# Patient Record
Sex: FEMALE | Race: BLACK OR AFRICAN AMERICAN | Employment: FULL TIME | URBAN - METROPOLITAN AREA
[De-identification: names, ages, dates, MRNs, and addresses within clinical notes are randomized per-mention and may not be internally consistent; named-entity substitution may affect disease eponyms.]

---

## 2019-08-16 ENCOUNTER — HOSPITAL ENCOUNTER (EMERGENCY)
Age: 49
Discharge: HOME | End: 2019-08-16
Attending: EMERGENCY MEDICINE | Admitting: EMERGENCY MEDICINE
Payer: OTHER GOVERNMENT

## 2019-08-16 VITALS
RESPIRATION RATE: 16 BRPM | DIASTOLIC BLOOD PRESSURE: 73 MMHG | HEIGHT: 60 IN | TEMPERATURE: 98.7 F | OXYGEN SATURATION: 100 % | BODY MASS INDEX: 23.56 KG/M2 | HEART RATE: 66 BPM | WEIGHT: 120 LBS | SYSTOLIC BLOOD PRESSURE: 119 MMHG

## 2019-08-16 DIAGNOSIS — K85.90 ACUTE PANCREATITIS, UNSPECIFIED COMPLICATION STATUS, UNSPECIFIED PANCREATITIS TYPE: Primary | ICD-10-CM

## 2019-08-16 LAB
ALBUMIN SERPL-MCNC: 3.8 G/DL (ref 3.4–5)
ALBUMIN/GLOB SERPL: 1.2 {RATIO} (ref 0.8–1.7)
ALP SERPL-CCNC: 88 U/L (ref 45–117)
ALT SERPL-CCNC: 31 U/L (ref 13–56)
ANION GAP SERPL CALC-SCNC: 7 MMOL/L (ref 3–18)
APPEARANCE UR: ABNORMAL
AST SERPL-CCNC: 17 U/L (ref 10–38)
BACTERIA URNS QL MICRO: ABNORMAL /HPF
BASOPHILS # BLD: 0 K/UL (ref 0–0.1)
BASOPHILS NFR BLD: 0 % (ref 0–2)
BILIRUB SERPL-MCNC: 0.4 MG/DL (ref 0.2–1)
BILIRUB UR QL: NEGATIVE
BUN SERPL-MCNC: 11 MG/DL (ref 7–18)
BUN/CREAT SERPL: 14 (ref 12–20)
CALCIUM SERPL-MCNC: 8.5 MG/DL (ref 8.5–10.1)
CHLORIDE SERPL-SCNC: 103 MMOL/L (ref 100–111)
CO2 SERPL-SCNC: 31 MMOL/L (ref 21–32)
COLOR UR: YELLOW
CREAT SERPL-MCNC: 0.76 MG/DL (ref 0.6–1.3)
DIFFERENTIAL METHOD BLD: ABNORMAL
EOSINOPHIL # BLD: 0.4 K/UL (ref 0–0.4)
EOSINOPHIL NFR BLD: 6 % (ref 0–5)
EPITH CASTS URNS QL MICRO: ABNORMAL /LPF (ref 0–5)
ERYTHROCYTE [DISTWIDTH] IN BLOOD BY AUTOMATED COUNT: 12.7 % (ref 11.6–14.5)
GLOBULIN SER CALC-MCNC: 3.3 G/DL (ref 2–4)
GLUCOSE SERPL-MCNC: 135 MG/DL (ref 74–99)
GLUCOSE UR STRIP.AUTO-MCNC: NEGATIVE MG/DL
HCT VFR BLD AUTO: 36.4 % (ref 35–45)
HGB BLD-MCNC: 12.3 G/DL (ref 12–16)
HGB UR QL STRIP: ABNORMAL
KETONES UR QL STRIP.AUTO: NEGATIVE MG/DL
LEUKOCYTE ESTERASE UR QL STRIP.AUTO: NEGATIVE
LIPASE SERPL-CCNC: 600 U/L (ref 73–393)
LYMPHOCYTES # BLD: 1.4 K/UL (ref 0.9–3.6)
LYMPHOCYTES NFR BLD: 24 % (ref 21–52)
MCH RBC QN AUTO: 28.2 PG (ref 24–34)
MCHC RBC AUTO-ENTMCNC: 33.8 G/DL (ref 31–37)
MCV RBC AUTO: 83.5 FL (ref 74–97)
MONOCYTES # BLD: 0.2 K/UL (ref 0.05–1.2)
MONOCYTES NFR BLD: 4 % (ref 3–10)
NEUTS SEG # BLD: 4 K/UL (ref 1.8–8)
NEUTS SEG NFR BLD: 66 % (ref 40–73)
NITRITE UR QL STRIP.AUTO: NEGATIVE
PH UR STRIP: 6 [PH] (ref 5–8)
PLATELET # BLD AUTO: 225 K/UL (ref 135–420)
PMV BLD AUTO: 9.4 FL (ref 9.2–11.8)
POTASSIUM SERPL-SCNC: 4 MMOL/L (ref 3.5–5.5)
PROT SERPL-MCNC: 7.1 G/DL (ref 6.4–8.2)
PROT UR STRIP-MCNC: NEGATIVE MG/DL
RBC # BLD AUTO: 4.36 M/UL (ref 4.2–5.3)
RBC #/AREA URNS HPF: ABNORMAL /HPF (ref 0–5)
SODIUM SERPL-SCNC: 141 MMOL/L (ref 136–145)
SP GR UR REFRACTOMETRY: 1.01 (ref 1–1.03)
UROBILINOGEN UR QL STRIP.AUTO: 1 EU/DL (ref 0.2–1)
WBC # BLD AUTO: 6 K/UL (ref 4.6–13.2)
WBC URNS QL MICRO: NEGATIVE /HPF (ref 0–4)

## 2019-08-16 PROCEDURE — 85025 COMPLETE CBC W/AUTO DIFF WBC: CPT

## 2019-08-16 PROCEDURE — 96374 THER/PROPH/DIAG INJ IV PUSH: CPT

## 2019-08-16 PROCEDURE — 83690 ASSAY OF LIPASE: CPT

## 2019-08-16 PROCEDURE — 80053 COMPREHEN METABOLIC PANEL: CPT

## 2019-08-16 PROCEDURE — 81001 URINALYSIS AUTO W/SCOPE: CPT

## 2019-08-16 PROCEDURE — 99283 EMERGENCY DEPT VISIT LOW MDM: CPT

## 2019-08-16 PROCEDURE — 74011250636 HC RX REV CODE- 250/636: Performed by: PHYSICIAN ASSISTANT

## 2019-08-16 PROCEDURE — 96361 HYDRATE IV INFUSION ADD-ON: CPT

## 2019-08-16 RX ORDER — ONDANSETRON 4 MG/1
TABLET, ORALLY DISINTEGRATING ORAL
Qty: 10 TAB | Refills: 0 | Status: SHIPPED | OUTPATIENT
Start: 2019-08-16 | End: 2021-06-09

## 2019-08-16 RX ORDER — FAMOTIDINE 10 MG/ML
20 INJECTION INTRAVENOUS
Status: COMPLETED | OUTPATIENT
Start: 2019-08-16 | End: 2019-08-16

## 2019-08-16 RX ADMIN — SODIUM CHLORIDE 1000 ML: 900 INJECTION, SOLUTION INTRAVENOUS at 11:41

## 2019-08-16 RX ADMIN — FAMOTIDINE 20 MG: 10 INJECTION, SOLUTION INTRAVENOUS at 11:41

## 2019-08-16 NOTE — DISCHARGE INSTRUCTIONS
Patient Education        Pancreatitis: Care Instructions  Your Care Instructions    The pancreas is an organ behind the stomach. It makes hormones and enzymes to help your body digest food. But if these enzymes attack the pancreas, it can get inflamed. This is called pancreatitis. Most cases are caused by gallstones or by heavy alcohol use. If you take care of yourself at home, it will help you get better. It will also help you avoid more problems with your pancreas. Follow-up care is a key part of your treatment and safety. Be sure to make and go to all appointments, and call your doctor if you are having problems. It's also a good idea to know your test results and keep a list of the medicines you take. How can you care for yourself at home? · Drink clear liquids and eat bland foods until you feel better. [place] foods include rice, dry toast, and crackers. They also include bananas and applesauce. · Eat a low-fat diet until your doctor says your pancreas is healed. · Do not drink alcohol. Tell your doctor if you need help to quit. Counseling, support groups, and sometimes medicines can help you stay sober. · Be safe with medicines. Read and follow all instructions on the label. ? If the doctor gave you a prescription medicine for pain, take it as prescribed. ? If you are not taking a prescription pain medicine, ask your doctor if you can take an over-the-counter medicine. · If your doctor prescribed antibiotics, take them as directed. Do not stop taking them just because you feel better. You need to take the full course of antibiotics. · Get extra rest until you feel better. To prevent future problems with your pancreas  · Do not drink alcohol. · Tell your doctors and pharmacist that you've had pancreatitis. They can help you avoid medicines that may cause this problem again. When should you call for help? Call 911 anytime you think you may need emergency care.  For example, call if:    · You vomit blood or what looks like coffee grounds.     · Your stools are maroon or very bloody.    Call your doctor now or seek immediate medical care if:    · You have new or worse belly pain.     · Your stools are black and look like tar, or they have streaks of blood.     · You are vomiting.    Watch closely for changes in your health, and be sure to contact your doctor if:    · You do not get better as expected. Where can you learn more? Go to http://nithin-lenore.info/. Enter T216 in the search box to learn more about \"Pancreatitis: Care Instructions. \"  Current as of: November 7, 2018  Content Version: 12.1  © 0217-0331 [employer]wise, [employer]. Care instructions adapted under license by [employer] (which disclaims liability or warranty for this information). If you have questions about a medical condition or this instruction, always ask your healthcare professional. [address] any warranty or liability for your use of this information.

## 2019-08-16 NOTE — ED PROVIDER NOTES
[facility] EMERGENCY DEPT      10:57 AM    Date: 8/16/2019  Patient Name: Franchesca Aldana    History of Presenting Illness     Chief Complaint   Patient presents with    Abdominal Pain       51 y.o. female with noted past medical history including GERD and IBS who presents to the emergency department c/o abdominal pain for the past 5 days. She states it is a mild aching pain that is constant in nature. She has not been eating as much as usual and is slightly nauseas. Pt is having normal bowel movements, most recent was this morning. She denies any fever, chills, vomiting, diarrhea, constipation, blood per rectum or other symptoms at this time. Patient denies any other associated signs or symptoms. Patient denies any other complaints. Nursing notes regarding the HPI and triage nursing notes were reviewed. Prior medical records were reviewed. Current Outpatient Medications   Medication Sig Dispense Refill    ondansetron (ZOFRAN ODT) 4 mg disintegrating tablet Take 1-2 tablets every 6-8 hours as needed for nausea and vomiting. 10 Tab 0    omeprazole (PRILOSEC) 20 mg capsule Take 20 mg by mouth daily.  levothyroxine (SYNTHROID) 75 mcg tablet 75 mcg nightly.  loratadine (CLARITIN) 10 mg tablet Take 10 mg by mouth daily as needed.  albuterol (PROVENTIL HFA, VENTOLIN HFA, PROAIR HFA) 90 mcg/actuation inhaler Take 2 Puffs by inhalation every six (6) hours as needed for Wheezing.          Past History     Past Medical History:  Past Medical History:   Diagnosis Date    Adverse effect of anesthesia     says \" stopped breathing with EGD\"    Arthritis     Asthma     Bacteria in urine     Cervical endometriosis     Chronic cystitis     Complex renal cyst     Left Bosniak II complex renal cyst stable at 2.7cm      Endometriosis     Essential hypertension     GERD (gastroesophageal reflux disease)     Graves disease     Gross hematuria     IBS (irritable bowel syndrome)  Kidney stone     Menorrhagia     Prediabetes 2018    Recurrent UTI     Renal stone     Sickle cell trait (HCC)     Tachycardia     Ureteral stone        Past Surgical History:  Past Surgical History:   Procedure Laterality Date    HX CARPAL TUNNEL RELEASE      HX COLONOSCOPY  01/2014    [address]    HX CYSTECTOMY      HX HERNIA REPAIR      [facility]    HX OTHER SURGICAL  08/2014    iron tranfusions    HX OTHER SURGICAL  10/2017    patient thinks she had a endoscopy    HX PELVIC LAPAROSCOPY      HX TUBAL LIGATION      HX UROLOGICAL  08/15/2016    Cysto, bilateral RPG, bladder biopsies and fulguration. Dr. Manuel Villar, [address].  HX UROLOGICAL  02/20/2019    Left ESWL. Dr. Amol Beard. Family History:  Family History   Problem Relation Age of Onset    Cancer Maternal Grandmother     Heart Disease Mother     Heart Disease Father     Diabetes Maternal Aunt        Social History:  Social History     Tobacco Use    Smoking status: Never Smoker    Smokeless tobacco: Never Used   Substance Use Topics    Alcohol use: No     Frequency: Never    Drug use: No       Allergies: Allergies   Allergen Reactions    Acetaminophen Other (comments)     Denies this allergy    Benadryl [Diphenhydramine Hcl] Rash    Codeine Other (comments)     Gi stress  Stomach cramps    Hydrocodone Other (comments)     Causes stomach cramps. Patient states she can take  Tylenol    Hydrocodone-Acetaminophen Other (comments)     Gi stress    Tylenol-Codeine #3 [Acetaminophen-Codeine] Other (comments)     Gi stress. Can take Tylenol       Patient's primary care provider (as noted in EPIC): To, Phil Schaumann, MD    Review of Systems   Constitutional:  Denies malaise, fever, chills. GI/ABD: + periumbilical pain. Denies injury, distention, nausea, vomiting, diarrhea. :  Denies injury, pain, dysuria or urgency. Back:  Denies injury or pain. Pelvis:  Denies injury or pain.    Extremity/MS: Denies injury or pain. Skin: Denies injury, rash, itching or skin changes. All other systems negative as reviewed. Visit Vitals  /73 (BP 1 Location: Right arm, BP Patient Position: At rest)   Pulse 66   Temp 98.7 °F (37.1 °C)   Resp 16   Ht 5' (1.524 m)   Wt 54.4 kg (120 lb)   SpO2 100%   BMI 23.44 kg/m²       Patient Vitals for the past 12 hrs:   Temp Pulse Resp BP SpO2   08/16/19 1057 98.7 °F (37.1 °C) 66 16 119/73 100 %       PHYSICAL EXAM:    CONSTITUTIONAL:  Alert, in no apparent distress;  well developed;  well nourished. HEAD:  Normocephalic, atraumatic. EYES:  EOMI. Non-icteric sclera. Normal conjunctiva. ENTM:  Mouth: mucous membranes moist.  NECK:  Supple  RESPIRATORY:  Chest clear, equal breath sounds, good air movement. Without wheezes, rhonchi or rales. CARDIOVASCULAR:  Regular rate and rhythm. No murmurs, rubs, or gallops. GI:  Normal bowel sounds, abdomen soft and non-tender. No rebound or guarding. BACK:  Non-tender. UPPER EXT:  Normal inspection. NEURO:  Moves all four extremities, and grossly normal motor exam.  SKIN:  No rashes;  Normal for age. PSYCH:  Alert and normal affect. DIFFERENTIAL DIAGNOSES/ MEDICAL DECISION MAKING:  Gastritis, gerd, peptic ulcer disease, cholecystitis, pancreatitis, gastroenteritis, hepatitis, constipation related pain, appendicitis pain, diverticulitis, urinary tract infection, obstruction, abdominal wall pain, versus combination of the above and/or numerous other processes/ etiologies. ED COURSE AND MEDICAL DECISION MAKING:    The patient's pain improved in the ED with the noted medications. On reassessment of the patient, the patient continues to have no surgical abdomen with no rebound nor guarding. The patient does not appear septic by presentation, vital signs and laboratory results. The patient continues to appear non-toxic in the emergency department on reevaluations.     Recent Results (from the past 12 hour(s)) URINALYSIS W/ RFLX MICROSCOPIC    Collection Time: 08/16/19 11:02 AM   Result Value Ref Range    Color YELLOW      Appearance CLOUDY      Specific gravity 1.014 1.005 - 1.030      pH (UA) 6.0 5.0 - 8.0      Protein NEGATIVE  NEG mg/dL    Glucose NEGATIVE  NEG mg/dL    Ketone NEGATIVE  NEG mg/dL    Bilirubin NEGATIVE  NEG      Blood SMALL (A) NEG      Urobilinogen 1.0 0.2 - 1.0 EU/dL    Nitrites NEGATIVE  NEG      Leukocyte Esterase NEGATIVE  NEG     URINE MICROSCOPIC ONLY    Collection Time: 08/16/19 11:02 AM   Result Value Ref Range    WBC NEGATIVE  0 - 4 /hpf    RBC 0 to 3 0 - 5 /hpf    Epithelial cells 2+ 0 - 5 /lpf    Bacteria FEW (A) NEG /hpf   CBC WITH AUTOMATED DIFF    Collection Time: 08/16/19 11:10 AM   Result Value Ref Range    WBC 6.0 4.6 - 13.2 K/uL    RBC 4.36 4.20 - 5.30 M/uL    HGB 12.3 12.0 - 16.0 g/dL    HCT 36.4 35.0 - 45.0 %    MCV 83.5 74.0 - 97.0 FL    MCH 28.2 24.0 - 34.0 PG    MCHC 33.8 31.0 - 37.0 g/dL    RDW 12.7 11.6 - 14.5 %    PLATELET 854 249 - 833 K/uL    MPV 9.4 9.2 - 11.8 FL    NEUTROPHILS 66 40 - 73 %    LYMPHOCYTES 24 21 - 52 %    MONOCYTES 4 3 - 10 %    EOSINOPHILS 6 (H) 0 - 5 %    BASOPHILS 0 0 - 2 %    ABS. NEUTROPHILS 4.0 1.8 - 8.0 K/UL    ABS. LYMPHOCYTES 1.4 0.9 - 3.6 K/UL    ABS. MONOCYTES 0.2 0.05 - 1.2 K/UL    ABS. EOSINOPHILS 0.4 0.0 - 0.4 K/UL    ABS.  BASOPHILS 0.0 0.0 - 0.1 K/UL    DF AUTOMATED     METABOLIC PANEL, COMPREHENSIVE    Collection Time: 08/16/19 11:10 AM   Result Value Ref Range    Sodium 141 136 - 145 mmol/L    Potassium 4.0 3.5 - 5.5 mmol/L    Chloride 103 100 - 111 mmol/L    CO2 31 21 - 32 mmol/L    Anion gap 7 3.0 - 18 mmol/L    Glucose 135 (H) 74 - 99 mg/dL    BUN 11 7.0 - 18 MG/DL    Creatinine 0.76 0.6 - 1.3 MG/DL    BUN/Creatinine ratio 14 12 - 20      GFR est AA >60 >60 ml/min/1.73m2    GFR est non-AA >60 >60 ml/min/1.73m2    Calcium 8.5 8.5 - 10.1 MG/DL    Bilirubin, total 0.4 0.2 - 1.0 MG/DL    ALT (SGPT) 31 13 - 56 U/L    AST (SGOT) 17 10 - 38 U/L    Alk. phosphatase 88 45 - 117 U/L    Protein, total 7.1 6.4 - 8.2 g/dL    Albumin 3.8 3.4 - 5.0 g/dL    Globulin 3.3 2.0 - 4.0 g/dL    A-G Ratio 1.2 0.8 - 1.7     LIPASE    Collection Time: 08/16/19 11:10 AM   Result Value Ref Range    Lipase 600 (H) 73 - 393 U/L      IMPRESSION AND MEDICAL DECISION MAKING:  Patient appears to have mild pancreatitis. She has not had any vomiting, but intermittent nausea. She is not having any nausea currently, declined any antiemetics here. I have given her IV fluids. Instructions on clear liquid diet, then slowly add back bland foods. She agrees to follow-up with her GI doctor. She also may take Tylenol or ibuprofen at home for pain. Pt will return here for any worsening. However, I do believe that the patient is stable and can be discharged home with further outpatient evaluation of the abdominal pain by the patient's GI doctor. Diagnosis:   1. Acute pancreatitis, unspecified complication status, unspecified pancreatitis type      Disposition: Discharge    Follow-up Information     Follow up With Specialties Details Why Contact Info    Kaila Salazar MD Gastroenterology, Internal Medicine In 3 days  [address] [address] [address] EMERGENCY DEPT Emergency Medicine  If symptoms worsen [address] [facility] 76. [phone]          Patient's Medications   Start Taking    ONDANSETRON (ZOFRAN ODT) 4 MG DISINTEGRATING TABLET    Take 1-2 tablets every 6-8 hours as needed for nausea and vomiting. Continue Taking    ALBUTEROL (PROVENTIL HFA, VENTOLIN HFA, PROAIR HFA) 90 MCG/ACTUATION INHALER    Take 2 Puffs by inhalation every six (6) hours as needed for Wheezing. LEVOTHYROXINE (SYNTHROID) 75 MCG TABLET    75 mcg nightly. LORATADINE (CLARITIN) 10 MG TABLET    Take 10 mg by mouth daily as needed. OMEPRAZOLE (PRILOSEC) 20 MG CAPSULE    Take 20 mg by mouth daily.    These Medications have changed    No medications on file   Stop Taking    CEPHALEXIN (KEFLEX) 250 MG CAPSULE    Take 1 Cap by mouth four (4) times daily.      LIONEL Ramirez

## 2019-08-16 NOTE — ED TRIAGE NOTES
Mid abdominal pain w/ nausea since yesterday. Hx elevated lipase in past. Feels same.  Unable to zip pants due to pain

## 2019-08-16 NOTE — LETTER
[address] [facility] EMERGENCY DEPT 
[address] 
[address] [facility] [address] [phone] Work/School Note Date: 8/16/2019 To Whom It May concern: 
 
Brittaney Riddle was seen and treated today in the emergency room by the following provider(s): 
Attending Provider: Mira Kebede DO Physician Assistant: LIONEL Amos. Brittaney Riddle may return to work on 8/18/19. Sincerely, LIONEL Comer

## 2019-12-18 ENCOUNTER — APPOINTMENT (OUTPATIENT)
Dept: GENERAL RADIOLOGY | Age: 49
End: 2019-12-18
Attending: EMERGENCY MEDICINE
Payer: OTHER GOVERNMENT

## 2019-12-18 ENCOUNTER — HOSPITAL ENCOUNTER (EMERGENCY)
Age: 49
Discharge: HOME | End: 2019-12-18
Attending: EMERGENCY MEDICINE
Payer: OTHER GOVERNMENT

## 2019-12-18 VITALS
RESPIRATION RATE: 19 BRPM | DIASTOLIC BLOOD PRESSURE: 74 MMHG | OXYGEN SATURATION: 96 % | HEART RATE: 67 BPM | TEMPERATURE: 98.5 F | SYSTOLIC BLOOD PRESSURE: 119 MMHG

## 2019-12-18 DIAGNOSIS — R06.02 SOB (SHORTNESS OF BREATH): Primary | ICD-10-CM

## 2019-12-18 LAB
ALBUMIN SERPL-MCNC: 3.7 G/DL (ref 3.4–5)
ALBUMIN/GLOB SERPL: 0.9 {RATIO} (ref 0.8–1.7)
ALP SERPL-CCNC: 95 U/L (ref 45–117)
ALT SERPL-CCNC: 26 U/L (ref 13–56)
ANION GAP SERPL CALC-SCNC: 3 MMOL/L (ref 3–18)
AST SERPL-CCNC: 15 U/L (ref 10–38)
ATRIAL RATE: 67 BPM
BASOPHILS # BLD: 0 K/UL (ref 0–0.1)
BASOPHILS NFR BLD: 1 % (ref 0–2)
BILIRUB SERPL-MCNC: 0.4 MG/DL (ref 0.2–1)
BNP SERPL-MCNC: 10 PG/ML (ref 0–450)
BUN SERPL-MCNC: 13 MG/DL (ref 7–18)
BUN/CREAT SERPL: 18 (ref 12–20)
CALCIUM SERPL-MCNC: 8.8 MG/DL (ref 8.5–10.1)
CALCULATED P AXIS, ECG09: 61 DEGREES
CALCULATED R AXIS, ECG10: 25 DEGREES
CALCULATED T AXIS, ECG11: 38 DEGREES
CHLORIDE SERPL-SCNC: 108 MMOL/L (ref 100–111)
CK MB CFR SERPL CALC: NORMAL % (ref 0–4)
CK MB SERPL-MCNC: <1 NG/ML (ref 5–25)
CK SERPL-CCNC: 44 U/L (ref 26–192)
CO2 SERPL-SCNC: 31 MMOL/L (ref 21–32)
CREAT SERPL-MCNC: 0.74 MG/DL (ref 0.6–1.3)
DIAGNOSIS, 93000: NORMAL
DIFFERENTIAL METHOD BLD: ABNORMAL
EOSINOPHIL # BLD: 0.3 K/UL (ref 0–0.4)
EOSINOPHIL NFR BLD: 9 % (ref 0–5)
ERYTHROCYTE [DISTWIDTH] IN BLOOD BY AUTOMATED COUNT: 12.6 % (ref 11.6–14.5)
GLOBULIN SER CALC-MCNC: 4.1 G/DL (ref 2–4)
GLUCOSE SERPL-MCNC: 95 MG/DL (ref 74–99)
HCT VFR BLD AUTO: 36.1 % (ref 35–45)
HGB BLD-MCNC: 11.9 G/DL (ref 12–16)
LYMPHOCYTES # BLD: 1.1 K/UL (ref 0.9–3.6)
LYMPHOCYTES NFR BLD: 30 % (ref 21–52)
MCH RBC QN AUTO: 28.7 PG (ref 24–34)
MCHC RBC AUTO-ENTMCNC: 33 G/DL (ref 31–37)
MCV RBC AUTO: 87 FL (ref 74–97)
MONOCYTES # BLD: 0.2 K/UL (ref 0.05–1.2)
MONOCYTES NFR BLD: 5 % (ref 3–10)
NEUTS SEG # BLD: 2.2 K/UL (ref 1.8–8)
NEUTS SEG NFR BLD: 55 % (ref 40–73)
P-R INTERVAL, ECG05: 196 MS
PLATELET # BLD AUTO: 211 K/UL (ref 135–420)
PMV BLD AUTO: 9.5 FL (ref 9.2–11.8)
POTASSIUM SERPL-SCNC: 3.7 MMOL/L (ref 3.5–5.5)
PROT SERPL-MCNC: 7.8 G/DL (ref 6.4–8.2)
Q-T INTERVAL, ECG07: 404 MS
QRS DURATION, ECG06: 76 MS
QTC CALCULATION (BEZET), ECG08: 426 MS
RBC # BLD AUTO: 4.15 M/UL (ref 4.2–5.3)
SODIUM SERPL-SCNC: 142 MMOL/L (ref 136–145)
TROPONIN I BLD-MCNC: <0.04 NG/ML (ref 0–0.08)
TROPONIN I SERPL-MCNC: <0.02 NG/ML (ref 0–0.04)
VENTRICULAR RATE, ECG03: 67 BPM
WBC # BLD AUTO: 3.8 K/UL (ref 4.6–13.2)

## 2019-12-18 PROCEDURE — 84484 ASSAY OF TROPONIN QUANT: CPT

## 2019-12-18 PROCEDURE — 83880 ASSAY OF NATRIURETIC PEPTIDE: CPT

## 2019-12-18 PROCEDURE — 82550 ASSAY OF CK (CPK): CPT

## 2019-12-18 PROCEDURE — 99284 EMERGENCY DEPT VISIT MOD MDM: CPT

## 2019-12-18 PROCEDURE — 74011000250 HC RX REV CODE- 250: Performed by: PHYSICIAN ASSISTANT

## 2019-12-18 PROCEDURE — 94640 AIRWAY INHALATION TREATMENT: CPT

## 2019-12-18 PROCEDURE — 71045 X-RAY EXAM CHEST 1 VIEW: CPT

## 2019-12-18 PROCEDURE — 85025 COMPLETE CBC W/AUTO DIFF WBC: CPT

## 2019-12-18 PROCEDURE — 93005 ELECTROCARDIOGRAM TRACING: CPT

## 2019-12-18 PROCEDURE — 80053 COMPREHEN METABOLIC PANEL: CPT

## 2019-12-18 RX ORDER — IPRATROPIUM BROMIDE AND ALBUTEROL SULFATE 2.5; .5 MG/3ML; MG/3ML
3 SOLUTION RESPIRATORY (INHALATION) ONCE
Status: COMPLETED | OUTPATIENT
Start: 2019-12-18 | End: 2019-12-18

## 2019-12-18 RX ORDER — ALBUTEROL SULFATE 90 UG/1
2 AEROSOL, METERED RESPIRATORY (INHALATION)
Qty: 1 INHALER | Refills: 0 | Status: SHIPPED | OUTPATIENT
Start: 2019-12-18

## 2019-12-18 RX ADMIN — IPRATROPIUM BROMIDE AND ALBUTEROL SULFATE 3 ML: .5; 3 SOLUTION RESPIRATORY (INHALATION) at 10:30

## 2019-12-18 NOTE — DISCHARGE INSTRUCTIONS

## 2019-12-18 NOTE — ED PROVIDER NOTES
[facility] EMERGENCY DEPT      Date: 2019  Patient Name: Jaciel Mcneal    History of Presenting Illness     Chief Complaint   Patient presents with    Chest Pain    Shortness of Breath       51 y.o. female with noted past medical history including asthma who presents to the emergency department complaining of shortness of breath and chest pain intermittent over the past 3 days. Patient notes this feels similar to her prior asthma attacks. States that she becomes more dyspneic with ambulating. States she only has an  inhaler at home which she has been using without much relief. Describes her chest pain as an intermittent burning in the center of her chest, current episode came on early this morning and has been constant since then. Denies any cough, congestion, leg pain or swelling, estrogen use, prior PE/DVT, hemoptysis, other symptoms at this time. Patient denies any other associated signs or symptoms. Patient denies any other complaints. Nursing notes regarding the HPI and triage nursing notes were reviewed. Prior medical records were reviewed. Current Outpatient Medications   Medication Sig Dispense Refill    albuterol (PROVENTIL HFA, VENTOLIN HFA, PROAIR HFA) 90 mcg/actuation inhaler Take 2 Puffs by inhalation every four (4) hours as needed for Wheezing. 1 Inhaler 0    ondansetron (ZOFRAN ODT) 4 mg disintegrating tablet Take 1-2 tablets every 6-8 hours as needed for nausea and vomiting. 10 Tab 0    omeprazole (PRILOSEC) 20 mg capsule Take 20 mg by mouth daily.  levothyroxine (SYNTHROID) 75 mcg tablet 75 mcg nightly.  loratadine (CLARITIN) 10 mg tablet Take 10 mg by mouth daily as needed.          Past History     Past Medical History:  Past Medical History:   Diagnosis Date    Adverse effect of anesthesia     says \" stopped breathing with EGD\"    Arthritis     Asthma     Bacteria in urine     Cervical endometriosis     Chronic cystitis     Complex renal cyst     Left Bosniak II complex renal cyst stable at 2.7cm      Endometriosis     Essential hypertension     GERD (gastroesophageal reflux disease)     Graves disease     Gross hematuria     IBS (irritable bowel syndrome)     Kidney stone     Menorrhagia     Prediabetes 2018    Recurrent UTI     Renal stone     Sickle cell trait ([address]     Tachycardia     Ureteral stone        Past Surgical History:  Past Surgical History:   Procedure Laterality Date    HX CARPAL TUNNEL RELEASE      HX COLONOSCOPY  01/2014    [address]    HX CYSTECTOMY      HX HERNIA REPAIR      HX HYSTERECT[facility]  09/22/2014    [facility]    HX OTHER SURGICAL  08/2014    iron tranfusions    HX OTHER SURGICAL  10/2017    patient thinks she had a endoscopy    HX PELVIC LAPAROSCOPY      HX TUBAL LIGATION      HX UROLOGICAL  08/15/2016    Cysto, bilateral RPG, bladder biopsies and fulguration. Dr. Alisha Berry, [address].  HX UROLOGICAL  02/20/2019    Left ESWL. Dr. Sánchez Padilla. Family History:  Family History   Problem Relation Age of Onset    Cancer Maternal Grandmother     Heart Disease Mother     Heart Disease Father     Diabetes Maternal Aunt        Social History:  Social History     Tobacco Use    Smoking status: Never Smoker    Smokeless tobacco: Never Used   Substance Use Topics    Alcohol use: No     Frequency: Never    Drug use: No       Allergies: Allergies   Allergen Reactions    Acetaminophen Other (comments)     Denies this allergy    Benadryl [Diphenhydramine Hcl] Rash    Codeine Other (comments)     Gi stress  Stomach cramps    Hydrocodone Other (comments)     Causes stomach cramps. Patient states she can take  Tylenol    Hydrocodone-Acetaminophen Other (comments)     Gi stress    Tylenol-Codeine #3 [Acetaminophen-Codeine] Other (comments)     Gi stress. Can take Tylenol       Patient's primary care provider (as noted in EPIC):   Taurus Gregorio MD    Review of Systems   Constitutional:  Denies malaise, fever, chills. ENMT:  Denies sore throat. Neck:  Denies injury or pain. Chest:  Denies injury. Cardiac: + intermittent CP. Denies palpitations. Respiratory: + intermittent SOB & wheezing. Denies cough. Extremity/MS:  Denies injury or pain. Neuro:  Denies headache, LOC, dizziness, neurologic symptoms/deficits/paresthesias. Skin: Denies injury, rash, itching or skin changes. All other systems negative as reviewed. Visit Vitals  /74   Pulse 67   Temp 98.5 °F (36.9 °C)   Resp 19   SpO2 96%       PHYSICAL EXAM:    CONSTITUTIONAL:  Alert, in no apparent distress;  well developed;  well nourished. HEAD:  Normocephalic, atraumatic. EYES:  EOMI. Non-icteric sclera. Normal conjunctiva. ENTM:  Mouth: mucous membranes moist.  NECK: Supple  RESPIRATORY: Lung sounds clear, good air movement. Without wheezes, rhonchi, rales. CHEST: no reproducible TTP. CARDIOVASCULAR:  Regular rate and rhythm. No murmurs, rubs, or gallops. GI:  Normal bowel sounds, abdomen soft and non-tender. No rebound or guarding. BACK:  Non-tender. UPPER EXT:  Normal inspection. LOWER EXT:  No edema, no calf tenderness. NEURO:  Moves all four extremities, and grossly normal motor exam.  SKIN:  No rashes;  Normal for age. PSYCH:  Alert and normal affect. DIFFERENTIAL DIAGNOSES/ MEDICAL DECISION MAKING:  Acute asthma exacerbation, acute bronchitis, pneumonia, upper respiratory infection, pulmonary embolism, bronchospasm, various cardiac etiologies verus numerous other etiologies versus combination of the above. PERC score 0, doubt PE.     EKG: NSR rate of 67bpm; No STEMI.      Recent Results (from the past 12 hour(s))   EKG, 12 LEAD, INITIAL    Collection Time: 12/18/19  8:10 AM   Result Value Ref Range    Ventricular Rate 67 BPM    Atrial Rate 67 BPM    P-R Interval 196 ms    QRS Duration 76 ms    Q-T Interval 404 ms    QTC Calculation (Bezet) 426 ms    Calculated P Axis 61 degrees    Calculated R Axis 25 degrees    Calculated T Axis 38 degrees    Diagnosis       Normal sinus rhythm  Possible Left atrial enlargement  Borderline ECG  When compared with ECG of 08-JUL-2015 16:27,  No significant change was found  Confirmed by Rosalee Melendez ([id number]) on 12/18/2019 9:24:28 AM     CBC WITH AUTOMATED DIFF    Collection Time: 12/18/19  9:30 AM   Result Value Ref Range    WBC 3.8 (L) 4.6 - 13.2 K/uL    RBC 4.15 (L) 4.20 - 5.30 M/uL    HGB 11.9 (L) 12.0 - 16.0 g/dL    HCT 36.1 35.0 - 45.0 %    MCV 87.0 74.0 - 97.0 FL    MCH 28.7 24.0 - 34.0 PG    MCHC 33.0 31.0 - 37.0 g/dL    RDW 12.6 11.6 - 14.5 %    PLATELET 155 545 - 265 K/uL    MPV 9.5 9.2 - 11.8 FL    NEUTROPHILS 55 40 - 73 %    LYMPHOCYTES 30 21 - 52 %    MONOCYTES 5 3 - 10 %    EOSINOPHILS 9 (H) 0 - 5 %    BASOPHILS 1 0 - 2 %    ABS. NEUTROPHILS 2.2 1.8 - 8.0 K/UL    ABS. LYMPHOCYTES 1.1 0.9 - 3.6 K/UL    ABS. MONOCYTES 0.2 0.05 - 1.2 K/UL    ABS. EOSINOPHILS 0.3 0.0 - 0.4 K/UL    ABS. BASOPHILS 0.0 0.0 - 0.1 K/UL    DF AUTOMATED     METABOLIC PANEL, COMPREHENSIVE    Collection Time: 12/18/19  9:30 AM   Result Value Ref Range    Sodium 142 136 - 145 mmol/L    Potassium 3.7 3.5 - 5.5 mmol/L    Chloride 108 100 - 111 mmol/L    CO2 31 21 - 32 mmol/L    Anion gap 3 3.0 - 18 mmol/L    Glucose 95 74 - 99 mg/dL    BUN 13 7.0 - 18 MG/DL    Creatinine 0.74 0.6 - 1.3 MG/DL    BUN/Creatinine ratio 18 12 - 20      GFR est AA >60 >60 ml/min/1.73m2    GFR est non-AA >60 >60 ml/min/1.73m2    Calcium 8.8 8.5 - 10.1 MG/DL    Bilirubin, total 0.4 0.2 - 1.0 MG/DL    ALT (SGPT) 26 13 - 56 U/L    AST (SGOT) 15 10 - 38 U/L    Alk.  phosphatase 95 45 - 117 U/L    Protein, total 7.8 6.4 - 8.2 g/dL    Albumin 3.7 3.4 - 5.0 g/dL    Globulin 4.1 (H) 2.0 - 4.0 g/dL    A-G Ratio 0.9 0.8 - 1.7     CARDIAC PANEL,(CK, CKMB & TROPONIN)    Collection Time: 12/18/19  9:30 AM   Result Value Ref Range    CK 44 26 - 192 U/L    CK - MB <1.0 <3.6 ng/ml    CK-MB Index  0.0 - 4.0 %     CALCULATION NOT PERFORMED WHEN RESULT IS BELOW LINEAR LIMIT    Troponin-I, QT <0.02 0.0 - 0.045 NG/ML   NT-PRO BNP    Collection Time: 12/18/19  9:30 AM   Result Value Ref Range    NT pro-BNP 10 0 - 450 PG/ML   POC TROPONIN-I    Collection Time: 12/18/19 11:14 AM   Result Value Ref Range    Troponin-I (POC) <0.04 0.00 - 0.08 ng/mL      Xr Chest Port    Result Date: 12/18/2019  EXAM: XR CHEST PORT CLINICAL INDICATION/HISTORY: cp -Additional: None COMPARISON: None TECHNIQUE: Portable frontal view of the chest _______________ FINDINGS: SUPPORT DEVICES: None. HEART AND MEDIASTINUM: Unremarkable. LUNGS AND PLEURAL SPACES: Unremarkable. BONY THORAX AND SOFT TISSUES: Right upper quadrant surgical clips. _______________     IMPRESSION: No active cardiopulmonary disease. ED COURSE AND MEDICAL DECISION MAKING:    Patient given a DuoNeb here, states that she has much improvement with this. Notes that this feels similar to her prior asthma attacks. She had an unremarkable EKG as well as 2 sets of normal cardiac enzymes. Remainder of labs and CXR look well. Plan for discharge home. Patient may follow-up with her PCP, return immediately for any worsening. Diagnosis:   1. SOB (shortness of breath)      Disposition: Discharge    Follow-up Information     Follow up With Specialties Details Why Contact Info    To, Karishma Wise MD [facility] In 3 days  [address] [facility] EMERGENCY DEPT Emergency Medicine  Immediately if symptoms worsen [address] [facility] 76.  [phone]          Patient's Medications   Start Taking    ALBUTEROL (PROVENTIL HFA, VENTOLIN HFA, PROAIR HFA) 90 MCG/ACTUATION INHALER    Take 2 Puffs by inhalation every four (4) hours as needed for Wheezing. Continue Taking    LEVOTHYROXINE (SYNTHROID) 75 MCG TABLET    75 mcg nightly. LORATADINE (CLARITIN) 10 MG TABLET    Take 10 mg by mouth daily as needed.     OMEPRAZOLE (PRILOSEC) 20 MG CAPSULE    Take 20 mg by mouth daily. ONDANSETRON (ZOFRAN ODT) 4 MG DISINTEGRATING TABLET    Take 1-2 tablets every 6-8 hours as needed for nausea and vomiting. These Medications have changed    No medications on file   Stop Taking    ALBUTEROL (PROVENTIL HFA, VENTOLIN HFA, PROAIR HFA) 90 MCG/ACTUATION INHALER    Take 2 Puffs by inhalation every six (6) hours as needed for Wheezing.      LIONEL Valdivia

## 2019-12-18 NOTE — ED TRIAGE NOTES
\"I think I need a breathing treatment. I have chest pain and shortness of breath since yesterday. \"

## 2019-12-18 NOTE — LETTER
[facility] EMERGENCY DEPT 
[address] 
[address] [facility] [address] [phone] Work/School Note Date: 12/18/2019 To Whom It May concern: 
 
Karmen Du was seen and treated today in the emergency room by the following provider(s): 
Attending Provider: Dawna Ganser, MD 
Physician Assistant: LIONEL Chacon. Karmen Du may return to work on 12/19/19. Sincerely, LIONEL Villarreal

## 2019-12-29 ENCOUNTER — HOSPITAL ENCOUNTER (EMERGENCY)
Age: 49
Discharge: HOME | End: 2019-12-29
Attending: EMERGENCY MEDICINE
Payer: OTHER GOVERNMENT

## 2019-12-29 VITALS
DIASTOLIC BLOOD PRESSURE: 88 MMHG | OXYGEN SATURATION: 100 % | BODY MASS INDEX: 23.75 KG/M2 | RESPIRATION RATE: 16 BRPM | HEIGHT: 60 IN | HEART RATE: 78 BPM | SYSTOLIC BLOOD PRESSURE: 149 MMHG | TEMPERATURE: 98.9 F | WEIGHT: 121 LBS

## 2019-12-29 DIAGNOSIS — T14.8XXA BLISTER: Primary | ICD-10-CM

## 2019-12-29 PROCEDURE — 99282 EMERGENCY DEPT VISIT SF MDM: CPT

## 2019-12-29 RX ORDER — CEPHALEXIN 500 MG/1
500 CAPSULE ORAL 4 TIMES DAILY
Qty: 28 CAP | Refills: 0 | Status: SHIPPED | OUTPATIENT
Start: 2019-12-29 | End: 2020-01-05

## 2019-12-30 NOTE — DISCHARGE INSTRUCTIONS
Patient Education     Wound Care: After Your Visit  Your Care Instructions  Taking good care of your wound at home will help it heal quickly and reduce your chance of infection. The doctor has checked you carefully, but problems can develop later. If you notice any problems or new symptoms, get medical treatment right away. Follow-up care is a key part of your treatment and safety. Be sure to make and go to all appointments, and call your doctor if you are having problems. It's also a good idea to know your test results and keep a list of the medicines you take. How can you care for yourself at home? · Clean the area with soap and water 2 times a day unless your doctor gives you different instructions. Don't use hydrogen peroxide or alcohol, which can slow healing. ¨ You may cover the wound with a thin layer of antibiotic ointment, such as bacitracin, and a nonstick bandage. ¨ Apply more ointment and replace the bandage as needed. · Take pain medicines exactly as directed. Some pain is normal with a wound, but do not ignore pain that is getting worse instead of better. You could have an infection. ¨ If the doctor gave you a prescription medicine for pain, take it as prescribed. ¨ If you are not taking a prescription pain medicine, ask your doctor if you can take an over-the-counter medicine. · Your doctor may have closed your wound with stitches (sutures), staples, or skin glue. ¨ If you have stitches, your doctor may remove them after several days to 2 weeks. Or you may have stitches that dissolve on their own. ¨ If you have staples, your doctor may remove them after 7 to 10 days. ¨ If your wound was closed with skin glue, the glue will wear off in a few days to 2 weeks. When should you call for help? Call your doctor now or seek immediate medical care if:  · You have signs of infection, such as:  ¨ Increased pain, swelling, warmth, or redness near the wound.   ¨ Red streaks leading from the wound.  ¨ Pus draining from the wound. ¨ A fever. · You bleed so much from your incision that you soak one or more bandages over 2 to 4 hours. Watch closely for changes in your health, and be sure to contact your doctor if:  · The wound is not getting better each day. Where can you learn more? Go to [employer]  Enter M973 in the search box to learn more about \"Wound Care: After Your Visit. \"   © 2478-6557 [employer], [employer]. Care instructions adapted under license by [facility] (which disclaims liability or warranty for this information). This care instruction is for use with your licensed healthcare professional. If you have questions about a medical condition or this instruction, always ask your healthcare professional. [address] any warranty or liability for your use of this information. Content Version: 62.5.474688;  Last Revised: April 23, 2012

## 2019-12-30 NOTE — ED PROVIDER NOTES
EMERGENCY DEPARTMENT HISTORY AND PHYSICAL EXAM    Date: 12/29/2019  Patient Name: Josh Gould    History of Presenting Illness     Chief Complaint   Patient presents with    Foot Pain    Skin Problem         History Provided By: Patient    Chief Complaint: Blister  Duration: Days  Timing: Worsening  Location: Plantar surface of the right foot  Quality: Stinging  Severity: Mild to moderate  Modifying Factors: Worse when walking on the foot  Associated Symptoms: none       Additional History (Context): Josh Gould is a 51 y.o. female with a history of IBS, hypertension and prediabetes who presents today for history as listed above. Patient reports she started out with one small  blister to the bottom of her right foot that popped on its own and she is now concerned for infection. Patient reports mild surrounding erythema. Patient has not tried anything for this at home. Patient denies any trauma or puncture wounds. Patient denies any rash to her other foot or hands. Denies any concerns for STDs or any vaginal complaints. PCP: Norah Gregorio MD    Current Outpatient Medications   Medication Sig Dispense Refill    cephALEXin (KEFLEX) 500 mg capsule Take 1 Cap by mouth four (4) times daily for 7 days. 28 Cap 0    albuterol (PROVENTIL HFA, VENTOLIN HFA, PROAIR HFA) 90 mcg/actuation inhaler Take 2 Puffs by inhalation every four (4) hours as needed for Wheezing. 1 Inhaler 0    ondansetron (ZOFRAN ODT) 4 mg disintegrating tablet Take 1-2 tablets every 6-8 hours as needed for nausea and vomiting. 10 Tab 0    omeprazole (PRILOSEC) 20 mg capsule Take 20 mg by mouth daily.  levothyroxine (SYNTHROID) 75 mcg tablet 75 mcg nightly.  loratadine (CLARITIN) 10 mg tablet Take 10 mg by mouth daily as needed.          Past History     Past Medical History:  Past Medical History:   Diagnosis Date    Adverse effect of anesthesia     says \" stopped breathing with EGD\"    Arthritis     Asthma     Bacteria in urine     Cervical endometriosis     Chronic cystitis     Complex renal cyst     Left Bosniak II complex renal cyst stable at 2.7cm      Endometriosis     Essential hypertension     GERD (gastroesophageal reflux disease)     Graves disease     Gross hematuria     IBS (irritable bowel syndrome)     Kidney stone     Menorrhagia     Prediabetes 2018    Recurrent UTI     Renal stone     Sickle cell trait ([address]     Tachycardia     Ureteral stone        Past Surgical History:  Past Surgical History:   Procedure Laterality Date    HX CARPAL TUNNEL RELEASE      HX COLONOSCOPY  01/2014    [address]    HX CYSTECTOMY      HX HERNIA REPAIR      HX HYSTERECTOMY  09/22/2014    [address]    HX OTHER SURGICAL  08/2014    iron tranfusions    HX OTHER SURGICAL  10/2017    patient thinks she had a endoscopy    HX PELVIC LAPAROSCOPY      HX TUBAL LIGATION      HX UROLOGICAL  08/15/2016    Cysto, bilateral RPG, bladder biopsies and fulguration. Dr. Ilia Fernandez, [address].  HX UROLOGICAL  02/20/2019    Left [facility]. Dr. Bobby Ball. Family History:  Family History   Problem Relation Age of Onset    Cancer Maternal Grandmother     Heart Disease Mother     Heart Disease Father     Diabetes Maternal Aunt        Social History:  Social History     Tobacco Use    Smoking status: Never Smoker    Smokeless tobacco: Never Used   Substance Use Topics    Alcohol use: No     Frequency: Never    Drug use: No       Allergies: Allergies   Allergen Reactions    Acetaminophen Other (comments)     Denies this allergy    Benadryl [Diphenhydramine Hcl] Rash    Codeine Other (comments)     Gi stress  Stomach cramps    Hydrocodone Other (comments)     Causes stomach cramps. Patient states she can take  Tylenol    Hydrocodone-Acetaminophen Other (comments)     Gi stress    Tylenol-Codeine #3 [Acetaminophen-Codeine] Other (comments)     Gi stress.  Can take Tylenol         Review of Systems   Review of Systems Constitutional: Negative for chills and fever. HENT: Negative for congestion, rhinorrhea and sore throat. Respiratory: Negative for cough and shortness of breath. Cardiovascular: Negative for chest pain. Gastrointestinal: Negative for abdominal pain, blood in stool, constipation, diarrhea, nausea and vomiting. Genitourinary: Negative for dysuria, frequency and hematuria. Musculoskeletal: Negative for back pain and myalgias. Skin: Positive for color change. Negative for rash. Neurological: Negative for dizziness and headaches. All other systems reviewed and are negative. All Other Systems Negative  Physical Exam     Vitals:    12/29/19 2243   BP: 149/88   Pulse: 78   Resp: 16   Temp: 98.9 °F (37.2 °C)   SpO2: 100%   Weight: 54.9 kg (121 lb)   Height: 5' (1.524 m)     Physical Exam  Vitals signs and nursing note reviewed. Constitutional:       General: She is not in acute distress. Appearance: She is well-developed. She is not diaphoretic. HENT:      Head: Normocephalic and atraumatic. Eyes:      Conjunctiva/sclera: Conjunctivae normal.   Neck:      Musculoskeletal: Normal range of motion and neck supple. Cardiovascular:      Rate and Rhythm: Normal rate and regular rhythm. Heart sounds: Normal heart sounds. Pulmonary:      Effort: Pulmonary effort is normal. No respiratory distress. Breath sounds: Normal breath sounds. Chest:      Chest wall: No tenderness. Abdominal:      General: Bowel sounds are normal. There is no distension. Palpations: Abdomen is soft. Tenderness: There is no tenderness. There is no guarding or rebound. Musculoskeletal:         General: No deformity. Skin:     General: Skin is warm and dry. Findings: Erythema present. Comments: Previously burst blister with surrounding erythema. No purulent drainage noted. No induration or fluctuance.    Neurological:      Mental Status: She is alert and oriented to person, place, and time.      Deep Tendon Reflexes: Reflexes are normal and symmetric. Diagnostic Study Results     Labs -   No results found for this or any previous visit (from the past 12 hour(s)). Radiologic Studies -   No orders to display     CT Results  (Last 48 hours)    None        CXR Results  (Last 48 hours)    None            Medical Decision Making   I am the first provider for this patient. I reviewed the vital signs, available nursing notes, past medical history, past surgical history, family history and social history. Vital Signs-Reviewed the patient's vital signs. Records Reviewed: Nursing Notes and Old Medical Records     Procedures: None   Procedures    Provider Notes (Medical Decision Making):     Differential: Blister, contact dermatitis, puncture wound, cellulitis, abscess      Plan: We will discharge home with course of antibiotics per patient request.  Have discussed proper wound care at home. Have advised patient to follow-up with PCP and to return as needed for wound check. Patient agrees with the plan and management and states all questions have been thoroughly answered and there are no more remaining questions. MED RECONCILIATION:  No current facility-administered medications for this encounter. Current Outpatient Medications   Medication Sig    cephALEXin (KEFLEX) 500 mg capsule Take 1 Cap by mouth four (4) times daily for 7 days.  albuterol (PROVENTIL HFA, VENTOLIN HFA, PROAIR HFA) 90 mcg/actuation inhaler Take 2 Puffs by inhalation every four (4) hours as needed for Wheezing.  ondansetron (ZOFRAN ODT) 4 mg disintegrating tablet Take 1-2 tablets every 6-8 hours as needed for nausea and vomiting.  omeprazole (PRILOSEC) 20 mg capsule Take 20 mg by mouth daily.  levothyroxine (SYNTHROID) 75 mcg tablet 75 mcg nightly.  loratadine (CLARITIN) 10 mg tablet Take 10 mg by mouth daily as needed.        Disposition:  Home     DISCHARGE NOTE:   Pt has been reexamined. Patient has no new complaints, changes, or physical findings. Care plan outlined and precautions discussed. Results of workup were reviewed with the patient. All medications were reviewed with the patient. All of pt's questions and concerns were addressed. Patient was instructed and agrees to follow up with PCP as well as to return to the ED upon further deterioration. Patient is ready to go home. Follow-up Information     Follow up With Specialties Details Why [facility] EMERGENCY DEPT Emergency Medicine In 2 days As needed, For wound re-check [address]  [phone]    To, Charbel Shaikh MD Family Practice Schedule an appointment as soon as possible for a visit  [address] [place] [id number]  [phone]            Current Discharge Medication List      START taking these medications    Details   cephALEXin (KEFLEX) 500 mg capsule Take 1 Cap by mouth four (4) times daily for 7 days. Qty: 28 Cap, Refills: 0                 Diagnosis     Clinical Impression:   1. Blister          \"Please note that this dictation was completed with [employer], the computer voice recognition software. Quite often unanticipated grammatical, syntax, homophones, and other interpretive errors are inadvertently transcribed by the computer software. Please disregard these errors. Please excuse any errors that have escaped final proofreading. \"